# Patient Record
Sex: FEMALE | Race: WHITE | Employment: OTHER | URBAN - METROPOLITAN AREA
[De-identification: names, ages, dates, MRNs, and addresses within clinical notes are randomized per-mention and may not be internally consistent; named-entity substitution may affect disease eponyms.]

---

## 2022-03-18 PROBLEM — N30.10 INTERSTITIAL CYSTITIS: Status: ACTIVE | Noted: 2021-04-01

## 2022-03-19 PROBLEM — N30.00 ACUTE CYSTITIS WITHOUT HEMATURIA: Status: ACTIVE | Noted: 2021-04-01

## 2022-08-04 ENCOUNTER — OFFICE VISIT (OUTPATIENT)
Dept: OBGYN CLINIC | Age: 82
End: 2022-08-04
Payer: MEDICARE

## 2022-08-04 VITALS — DIASTOLIC BLOOD PRESSURE: 74 MMHG | SYSTOLIC BLOOD PRESSURE: 120 MMHG

## 2022-08-04 DIAGNOSIS — N76.0 VAGINITIS AND VULVOVAGINITIS: Primary | ICD-10-CM

## 2022-08-04 DIAGNOSIS — R51.9 NONINTRACTABLE HEADACHE, UNSPECIFIED CHRONICITY PATTERN, UNSPECIFIED HEADACHE TYPE: ICD-10-CM

## 2022-08-04 DIAGNOSIS — R30.0 DYSURIA: ICD-10-CM

## 2022-08-04 LAB
BILIRUBIN, URINE, POC: NEGATIVE
BLOOD URINE, POC: NEGATIVE
GLUCOSE URINE, POC: NEGATIVE
KETONES, URINE, POC: NEGATIVE
LEUKOCYTE ESTERASE, URINE, POC: NORMAL
NITRITE, URINE, POC: NEGATIVE
PH, URINE, POC: 6 (ref 4.6–8)
PROTEIN,URINE, POC: NEGATIVE
SPECIFIC GRAVITY, URINE, POC: 1.02 (ref 1–1.03)
URINALYSIS CLARITY, POC: NORMAL
URINALYSIS COLOR, POC: YELLOW
UROBILINOGEN, POC: NORMAL

## 2022-08-04 PROCEDURE — 81002 URINALYSIS NONAUTO W/O SCOPE: CPT | Performed by: OBSTETRICS & GYNECOLOGY

## 2022-08-04 PROCEDURE — 99214 OFFICE O/P EST MOD 30 MIN: CPT | Performed by: OBSTETRICS & GYNECOLOGY

## 2022-08-04 PROCEDURE — 1123F ACP DISCUSS/DSCN MKR DOCD: CPT | Performed by: OBSTETRICS & GYNECOLOGY

## 2022-08-04 RX ORDER — NYSTATIN AND TRIAMCINOLONE ACETONIDE 100000; 1 [USP'U]/G; MG/G
OINTMENT TOPICAL
Qty: 60 G | Refills: 1 | Status: SHIPPED | OUTPATIENT
Start: 2022-08-04 | End: 2022-08-16 | Stop reason: SDUPTHER

## 2022-08-04 RX ORDER — BUTALBITAL, ACETAMINOPHEN AND CAFFEINE 50; 325; 40 MG/1; MG/1; MG/1
1 TABLET ORAL EVERY 6 HOURS PRN
Qty: 180 TABLET | Refills: 5 | Status: SHIPPED | OUTPATIENT
Start: 2022-08-04

## 2022-08-07 LAB
BACTERIA SPEC CULT: ABNORMAL
BACTERIA SPEC CULT: ABNORMAL
SERVICE CMNT-IMP: ABNORMAL

## 2022-08-08 ENCOUNTER — TELEPHONE (OUTPATIENT)
Dept: OBGYN CLINIC | Age: 82
End: 2022-08-08

## 2022-08-08 NOTE — TELEPHONE ENCOUNTER
----- Message from Jorge Perry MD sent at 8/8/2022  1:46 PM EDT -----  Will you please call her and either bring her in for a shot of rocephin, 1g IM, or offer to forward these results to her PCP if she is seeing them soon. Someone else was managing this also, I think.   Whitney Keenan    ----- Message -----  From: Ellen Nur Incoming [place] [employer]  Sent: 8/7/2022   1:23 PM EDT  To: Jorge Perry MD

## 2022-08-08 NOTE — TELEPHONE ENCOUNTER
Called pt, made aware of results. She is scheduled for [facility] tomorrow. All questions answered.

## 2022-08-09 ENCOUNTER — NURSE ONLY (OUTPATIENT)
Dept: OBGYN CLINIC | Age: 82
End: 2022-08-09
Payer: MEDICARE

## 2022-08-09 DIAGNOSIS — N39.0 URINARY TRACT INFECTION WITHOUT HEMATURIA, SITE UNSPECIFIED: Primary | ICD-10-CM

## 2022-08-09 PROCEDURE — 96372 THER/PROPH/DIAG INJ SC/IM: CPT | Performed by: OBSTETRICS & GYNECOLOGY

## 2022-08-09 RX ORDER — CEFTRIAXONE 500 MG/1
500 INJECTION, POWDER, FOR SOLUTION INTRAMUSCULAR; INTRAVENOUS EVERY 24 HOURS
Status: COMPLETED | OUTPATIENT
Start: 2022-08-09 | End: 2022-08-09

## 2022-08-09 RX ADMIN — CEFTRIAXONE 500 MG: 500 INJECTION, POWDER, FOR SOLUTION INTRAMUSCULAR; INTRAVENOUS at 13:09

## 2022-08-09 RX ADMIN — CEFTRIAXONE 500 MG: 500 INJECTION, POWDER, FOR SOLUTION INTRAMUSCULAR; INTRAVENOUS at 13:12

## 2022-08-09 NOTE — PROGRESS NOTES
Pt presents for Rocephin 1 gram IM for UTI-per Dr Suki Espitia recommendations. Reconstituted 500mg x2  Given in RGM   Pt tolerated well with no adverse reaction- pt waited in office for about 15 min after injection to make sure she did not have any issues. LOT number [id number]  EXP date Aug 2024      Follow up prn. Voiced full understanding.

## 2022-08-16 ENCOUNTER — OFFICE VISIT (OUTPATIENT)
Dept: OBGYN CLINIC | Age: 82
End: 2022-08-16
Payer: MEDICARE

## 2022-08-16 VITALS — BODY MASS INDEX: 30.54 KG/M2 | DIASTOLIC BLOOD PRESSURE: 76 MMHG | HEIGHT: 62 IN | SYSTOLIC BLOOD PRESSURE: 116 MMHG

## 2022-08-16 DIAGNOSIS — B35.6 TINEA CRURIS: ICD-10-CM

## 2022-08-16 DIAGNOSIS — N81.4 UTEROVAGINAL PROLAPSE, UNSPECIFIED: ICD-10-CM

## 2022-08-16 DIAGNOSIS — B35.4 TINEA CORPORIS: ICD-10-CM

## 2022-08-16 DIAGNOSIS — N76.0 VAGINITIS AND VULVOVAGINITIS: Primary | ICD-10-CM

## 2022-08-16 PROCEDURE — 99214 OFFICE O/P EST MOD 30 MIN: CPT | Performed by: OBSTETRICS & GYNECOLOGY

## 2022-08-16 PROCEDURE — G8427 DOCREV CUR MEDS BY ELIG CLIN: HCPCS | Performed by: OBSTETRICS & GYNECOLOGY

## 2022-08-16 PROCEDURE — 1123F ACP DISCUSS/DSCN MKR DOCD: CPT | Performed by: OBSTETRICS & GYNECOLOGY

## 2022-08-16 PROCEDURE — 1090F PRES/ABSN URINE INCON ASSESS: CPT | Performed by: OBSTETRICS & GYNECOLOGY

## 2022-08-16 PROCEDURE — G8399 PT W/DXA RESULTS DOCUMENT: HCPCS | Performed by: OBSTETRICS & GYNECOLOGY

## 2022-08-16 PROCEDURE — 1036F TOBACCO NON-USER: CPT | Performed by: OBSTETRICS & GYNECOLOGY

## 2022-08-16 PROCEDURE — G8417 CALC BMI ABV UP PARAM F/U: HCPCS | Performed by: OBSTETRICS & GYNECOLOGY

## 2022-08-16 RX ORDER — MIRTAZAPINE 15 MG/1
15 TABLET, FILM COATED ORAL EVERY EVENING
COMMUNITY
Start: 2022-08-10

## 2022-08-16 RX ORDER — NYSTATIN AND TRIAMCINOLONE ACETONIDE 100000; 1 [USP'U]/G; MG/G
OINTMENT TOPICAL
Qty: 60 G | Refills: 1 | Status: SHIPPED | OUTPATIENT
Start: 2022-08-16

## 2022-08-16 NOTE — PROGRESS NOTES
Patient with prolapse, incontinence and chronic yeast.  Here for followup. Meds have improved symptoms significantly per patient. She requests refill of terazol and mycology as she used some of her supply. Feels much better. Not well but headed in the right direction. AOK. Reassured regarding concerns. Follow up with dermatologist tomorrow as indicated. Patient counseled face to face for 30 minutes regarding her complaints, differential diagnosis and disposition with greater than 50% of the time spent in this way.

## 2022-09-21 ENCOUNTER — OFFICE VISIT (OUTPATIENT)
Dept: OBGYN CLINIC | Age: 82
End: 2022-09-21
Payer: MEDICARE

## 2022-09-21 VITALS — BODY MASS INDEX: 30.54 KG/M2 | HEIGHT: 62 IN

## 2022-09-21 DIAGNOSIS — R30.0 DYSURIA: Primary | ICD-10-CM

## 2022-09-21 LAB
BILIRUBIN, URINE, POC: NEGATIVE
BLOOD URINE, POC: NORMAL
GLUCOSE URINE, POC: NEGATIVE
KETONES, URINE, POC: NEGATIVE
LEUKOCYTE ESTERASE, URINE, POC: NORMAL
NITRITE, URINE, POC: POSITIVE
PH, URINE, POC: 5 (ref 4.6–8)
PROTEIN,URINE, POC: NEGATIVE
SPECIFIC GRAVITY, URINE, POC: 1.01 (ref 1–1.03)
URINALYSIS CLARITY, POC: CLEAR
URINALYSIS COLOR, POC: YELLOW
UROBILINOGEN, POC: NORMAL

## 2022-09-21 PROCEDURE — G8427 DOCREV CUR MEDS BY ELIG CLIN: HCPCS | Performed by: OBSTETRICS & GYNECOLOGY

## 2022-09-21 PROCEDURE — 81002 URINALYSIS NONAUTO W/O SCOPE: CPT | Performed by: OBSTETRICS & GYNECOLOGY

## 2022-09-21 PROCEDURE — 1090F PRES/ABSN URINE INCON ASSESS: CPT | Performed by: OBSTETRICS & GYNECOLOGY

## 2022-09-21 PROCEDURE — 1123F ACP DISCUSS/DSCN MKR DOCD: CPT | Performed by: OBSTETRICS & GYNECOLOGY

## 2022-09-21 PROCEDURE — G8399 PT W/DXA RESULTS DOCUMENT: HCPCS | Performed by: OBSTETRICS & GYNECOLOGY

## 2022-09-21 PROCEDURE — 1036F TOBACCO NON-USER: CPT | Performed by: OBSTETRICS & GYNECOLOGY

## 2022-09-21 PROCEDURE — 99214 OFFICE O/P EST MOD 30 MIN: CPT | Performed by: OBSTETRICS & GYNECOLOGY

## 2022-09-21 PROCEDURE — G8417 CALC BMI ABV UP PARAM F/U: HCPCS | Performed by: OBSTETRICS & GYNECOLOGY

## 2022-09-21 RX ORDER — FAMOTIDINE 20 MG/1
TABLET, FILM COATED ORAL
COMMUNITY
Start: 2022-09-07

## 2022-09-21 RX ORDER — CEPHALEXIN 500 MG/1
500 CAPSULE ORAL 2 TIMES DAILY
Qty: 14 CAPSULE | Refills: 0 | Status: SHIPPED | OUTPATIENT
Start: 2022-09-21 | End: 2022-09-28

## 2022-09-21 ASSESSMENT — PATIENT HEALTH QUESTIONNAIRE - PHQ9
SUM OF ALL RESPONSES TO PHQ QUESTIONS 1-9: 0
SUM OF ALL RESPONSES TO PHQ QUESTIONS 1-9: 0
2. FEELING DOWN, DEPRESSED OR HOPELESS: 0
SUM OF ALL RESPONSES TO PHQ QUESTIONS 1-9: 0
SUM OF ALL RESPONSES TO PHQ9 QUESTIONS 1 & 2: 0
SUM OF ALL RESPONSES TO PHQ QUESTIONS 1-9: 0
1. LITTLE INTEREST OR PLEASURE IN DOING THINGS: 0

## 2022-09-21 NOTE — PROGRESS NOTES
History of Present Illness:  Patient returns to the office today for further evaluation and management of an odor emanating from either her bladder or her vagina. She was treated for Klebsiella urinary tract infection with Rocephin a few weeks ago and reports that those symptoms abated quite quickly. She relates, however, that very similar symptoms have now recurred in addition to an odor that she is aware of. She denies specifically being aware of the odor constantly, but as she does use the restroom quite frequently that is when she primarily notices it. She is otherwise without complaints or concerns. Physical Exam:  Pelvic exam is performed, which reveals stable vaginal prolapse with well estrogenized appearing vaginal mucosa apparent over the visible tissue at the introitus. Wet prep is collected. There is no noted odor during the exam.  The vagina appears well estrogenized throughout. There does not appear to be any obvious vaginal discharge or skin breakdown. Wet prep fails to reveal any clue cells, any hyphal elements or any trichomonads. Impression/Plan: The patient is counseled to this effect. My impression is that she has a recurrence of her urinary tract infection of some form or fashion. Urine culture is sent. Empiric treatment with Keflex is prescribed today. She has tolerated this in the past, but ultimately reveals that she has had recent allergy testing and is no longer allergic to penicillin based on the lab testing. We will follow up on her urine culture and see her back in approximately two weeks' time to ensure complete resolution of this problem or on a p.r.n. basis. Additionally, she reports that her  got results today of progressing colon cancer despite seven months of treatment. She is understandably upset by this, but states that they will continue to follow the course. We will see her back in two weeks' time or on a p.r.n. basis.   Prescription for Keflex is sent as indicated. Bactrim Counseling:  I discussed with the patient the risks of sulfa antibiotics including but not limited to GI upset, allergic reaction, drug rash, diarrhea, dizziness, photosensitivity, and yeast infections.  Rarely, more serious reactions can occur including but not limited to aplastic anemia, agranulocytosis, methemoglobinemia, blood dyscrasias, liver or kidney failure, lung infiltrates or desquamative/blistering drug rashes.

## 2022-09-24 LAB
BACTERIA SPEC CULT: ABNORMAL
SERVICE CMNT-IMP: ABNORMAL

## 2022-09-26 ENCOUNTER — TELEPHONE (OUTPATIENT)
Dept: OBGYN CLINIC | Age: 82
End: 2022-09-26

## 2022-09-26 NOTE — TELEPHONE ENCOUNTER
----- Message from Edgardo Lynch MD sent at 9/26/2022  8:17 AM EDT -----  Please call her and check on her. I gave her Keflex last week and it should work but lets make sure.   Thanks [employer]

## 2022-09-26 NOTE — TELEPHONE ENCOUNTER
Called pt and made her aware of urine culture results. She states that she has noticed improvement in urinary odor and frequency. She does not feel that she is completely treated, however, she still has 2 more days left of medication. She is advised to ensure that she finishes the abx completely and let us know if she does not have completely resolved symptoms after completion. She voiced full understanding and is aware. All questions answered.

## 2022-09-28 ENCOUNTER — TELEPHONE (OUTPATIENT)
Dept: OBGYN CLINIC | Age: 82
End: 2022-09-28

## 2022-09-28 NOTE — TELEPHONE ENCOUNTER
Patient called requesting a refill of keflex. She states that she is still having symptoms of UTI and wants to know if she should take keflex again. Routed to Dr. Percy Fletcher for recommendations.

## 2022-09-29 RX ORDER — CIPROFLOXACIN 250 MG/1
250 TABLET, FILM COATED ORAL 2 TIMES DAILY
Qty: 14 TABLET | Refills: 0 | Status: SHIPPED | OUTPATIENT
Start: 2022-09-29 | End: 2022-10-06

## 2022-09-29 NOTE — TELEPHONE ENCOUNTER
Orders Placed This Encounter    ciprofloxacin (CIPRO) 250 MG tablet     Sig: Take 1 tablet by mouth 2 times daily for 7 days     Dispense:  14 tablet     Refill:  0

## 2022-09-29 NOTE — TELEPHONE ENCOUNTER
Pt returned call, she is aware of recommendations for cipro and voiced full understanding. She is aware to contact the office with any additional concerns. All questions answered.

## 2022-09-29 NOTE — TELEPHONE ENCOUNTER
Spoke with Dr. Jose A Peng in office since Dr. Sade Pappas is out of the office. He advises to send in cipro 250 mg BID for 7 days since urine culture shows susceptibility to cipro and allergy list in chart does not show allergy to cipro. LVM for pt to return my call.

## 2022-09-30 ENCOUNTER — TELEPHONE (OUTPATIENT)
Dept: OBGYN CLINIC | Age: 82
End: 2022-09-30

## 2022-09-30 RX ORDER — SULFAMETHOXAZOLE AND TRIMETHOPRIM 800; 160 MG/1; MG/1
1 TABLET ORAL 2 TIMES DAILY
Qty: 10 TABLET | Refills: 0 | Status: SHIPPED | OUTPATIENT
Start: 2022-09-30 | End: 2022-10-05

## 2022-09-30 NOTE — TELEPHONE ENCOUNTER
Patient called requesting to have cipro rx changed to keflex because she states that cipro will affect her tendons and she has torn tendons. She states that she has been advised not to take this. She uses [facility] in [facility]. Spoke with Dr. Savannah Lux in office, she advises to rx bactrim DS BID for 5 days. Spoke with pt and she is aware of recommendations and states that she has taken bactrim before and tolerates this well. She is advised to contact us with any other questions or concerns. All questions answered.

## 2022-10-06 ENCOUNTER — OFFICE VISIT (OUTPATIENT)
Dept: OBGYN CLINIC | Age: 82
End: 2022-10-06
Payer: MEDICARE

## 2022-10-06 VITALS
WEIGHT: 173.4 LBS | HEIGHT: 62 IN | BODY MASS INDEX: 31.91 KG/M2 | DIASTOLIC BLOOD PRESSURE: 85 MMHG | SYSTOLIC BLOOD PRESSURE: 139 MMHG

## 2022-10-06 DIAGNOSIS — N30.00 ACUTE CYSTITIS WITHOUT HEMATURIA: Primary | ICD-10-CM

## 2022-10-06 PROCEDURE — G8399 PT W/DXA RESULTS DOCUMENT: HCPCS | Performed by: OBSTETRICS & GYNECOLOGY

## 2022-10-06 PROCEDURE — 1036F TOBACCO NON-USER: CPT | Performed by: OBSTETRICS & GYNECOLOGY

## 2022-10-06 PROCEDURE — 1090F PRES/ABSN URINE INCON ASSESS: CPT | Performed by: OBSTETRICS & GYNECOLOGY

## 2022-10-06 PROCEDURE — 1123F ACP DISCUSS/DSCN MKR DOCD: CPT | Performed by: OBSTETRICS & GYNECOLOGY

## 2022-10-06 PROCEDURE — G8417 CALC BMI ABV UP PARAM F/U: HCPCS | Performed by: OBSTETRICS & GYNECOLOGY

## 2022-10-06 PROCEDURE — G8484 FLU IMMUNIZE NO ADMIN: HCPCS | Performed by: OBSTETRICS & GYNECOLOGY

## 2022-10-06 PROCEDURE — G8427 DOCREV CUR MEDS BY ELIG CLIN: HCPCS | Performed by: OBSTETRICS & GYNECOLOGY

## 2022-10-06 PROCEDURE — 99214 OFFICE O/P EST MOD 30 MIN: CPT | Performed by: OBSTETRICS & GYNECOLOGY

## 2022-10-06 NOTE — PROGRESS NOTES
History of Present Illness: The patient reports that her urinary tract symptoms have resolved. She is concerned that she may be developing a yeast infection. She has itching externally on her periurethral and vulvar area. Physical Exam:  On exam today, she has normal external genitalia, [place], [place], urethral glands. Her prolapse is stable. There is no yeast or hyphal elements apparent. Assessment and Plan:  I have suggested to her I will refill her Terazol, as well as her Nystatin cream, which she uses frequently for symptomatic relief. I have suggested that she may use this for a few days in an effort to provide some symptomatic relief. With respect to her upcoming cardiac ablation, I have suggested to her that she not have a urinary tract infection prior to that appointment. She agrees stating they said that if she has one they will cancel the procedure. With this in mind, I have suggested that she return to the office next week for urine culture only so that we will have time to address any urinary tract infection or colonization as the case may be prior to her scheduled cardiac ablation. She is amenable to this plan. She will contact the office if her external genital itching is not improved with her Nystatin or Terazol as indicated.

## 2022-10-11 ENCOUNTER — NURSE ONLY (OUTPATIENT)
Dept: OBGYN CLINIC | Age: 82
End: 2022-10-11

## 2022-10-11 DIAGNOSIS — N30.00 ACUTE CYSTITIS WITHOUT HEMATURIA: ICD-10-CM

## 2022-10-15 LAB
BACTERIA SPEC CULT: ABNORMAL
BACTERIA SPEC CULT: ABNORMAL
SERVICE CMNT-IMP: ABNORMAL

## 2022-10-17 ENCOUNTER — NURSE ONLY (OUTPATIENT)
Dept: OBGYN CLINIC | Age: 82
End: 2022-10-17
Payer: MEDICARE

## 2022-10-17 ENCOUNTER — TELEPHONE (OUTPATIENT)
Dept: OBGYN CLINIC | Age: 82
End: 2022-10-17

## 2022-10-17 DIAGNOSIS — N39.0 URINARY TRACT INFECTION WITHOUT HEMATURIA, SITE UNSPECIFIED: Primary | ICD-10-CM

## 2022-10-17 PROCEDURE — 96372 THER/PROPH/DIAG INJ SC/IM: CPT | Performed by: OBSTETRICS & GYNECOLOGY

## 2022-10-17 RX ORDER — CEFTRIAXONE 1 G/1
1000 INJECTION, POWDER, FOR SOLUTION INTRAMUSCULAR; INTRAVENOUS ONCE
Status: SHIPPED | OUTPATIENT
Start: 2022-10-17

## 2022-10-17 RX ORDER — CEFTRIAXONE 500 MG/1
500 INJECTION, POWDER, FOR SOLUTION INTRAMUSCULAR; INTRAVENOUS ONCE
Status: COMPLETED | OUTPATIENT
Start: 2022-10-17 | End: 2022-10-17

## 2022-10-17 RX ADMIN — CEFTRIAXONE 500 MG: 500 INJECTION, POWDER, FOR SOLUTION INTRAMUSCULAR; INTRAVENOUS at 16:34

## 2022-10-17 RX ADMIN — CEFTRIAXONE 500 MG: 500 INJECTION, POWDER, FOR SOLUTION INTRAMUSCULAR; INTRAVENOUS at 16:35

## 2022-10-17 NOTE — TELEPHONE ENCOUNTER
Called pt, made aware of results and Dr Roth Honour recommendations. She is scheduled to come in today.

## 2022-10-17 NOTE — TELEPHONE ENCOUNTER
----- Message from Addie Castillo MD sent at 10/17/2022  8:49 AM EDT -----  Please have her come in for Rocephin 1g IM.   Thanks, Daniel Primer  ----- Message -----  From: Ellen Nur Incoming [place] [employer]  Sent: 10/15/2022   7:59 AM EDT  To: Addie Castillo MD

## 2022-10-17 NOTE — PROGRESS NOTES
Pt presented to the office for rocephin injection. Pt was given 1000 mg rocephin IM into the right gluteus samia. Pt tolerated well without adverse reaction.

## 2022-10-17 NOTE — TELEPHONE ENCOUNTER
Spoke with pt after speaking with Dr Alen Méndez. Per Dr Alen Méndez, after reviewing urine culture results with him, there is nothing else that we can prescribed her orally to go along with the rocephin. She is fully aware of this, she will talk with her cardiologist to make them aware. She is scheduled for cardiac ablation on Monday. She will call back prn. All questions answered. She is given the name of the bacteria that grew in her urine the last 3 times.

## 2022-10-21 ENCOUNTER — TELEPHONE (OUTPATIENT)
Dept: OBGYN CLINIC | Age: 82
End: 2022-10-21

## 2022-10-21 NOTE — TELEPHONE ENCOUNTER
Pt called in and left voicemail to have someone contact her. She stated that she spoke with Juliana Leiva on Monday and received a Rocephin injection for a UTI. She believes she is have some kind of complications and has surgery on Monday. She would like a call back today, please.

## 2022-10-21 NOTE — TELEPHONE ENCOUNTER
Called pt, she states that she has Rx for bactrim that she used to take daily for UTI suppression. She states that her PCP told her that it was ok to start taking this. She is aware to notify her surgeon of this medication. Voiced full understanding.

## 2022-11-02 ENCOUNTER — OFFICE VISIT (OUTPATIENT)
Dept: OBGYN CLINIC | Age: 82
End: 2022-11-02
Payer: MEDICARE

## 2022-11-02 VITALS — HEIGHT: 62 IN | DIASTOLIC BLOOD PRESSURE: 74 MMHG | BODY MASS INDEX: 31.72 KG/M2 | SYSTOLIC BLOOD PRESSURE: 122 MMHG

## 2022-11-02 DIAGNOSIS — R30.0 DYSURIA: Primary | ICD-10-CM

## 2022-11-02 DIAGNOSIS — N81.4 UTEROVAGINAL PROLAPSE, UNSPECIFIED: ICD-10-CM

## 2022-11-02 LAB
BILIRUBIN, URINE, POC: NEGATIVE
BLOOD URINE, POC: NEGATIVE
GLUCOSE URINE, POC: NEGATIVE
KETONES, URINE, POC: NEGATIVE
LEUKOCYTE ESTERASE, URINE, POC: NEGATIVE
NITRITE, URINE, POC: NEGATIVE
PH, URINE, POC: 5 (ref 4.6–8)
PROTEIN,URINE, POC: NEGATIVE
SPECIFIC GRAVITY, URINE, POC: 1 (ref 1–1.03)
URINALYSIS CLARITY, POC: CLEAR
URINALYSIS COLOR, POC: NORMAL
UROBILINOGEN, POC: NORMAL

## 2022-11-02 PROCEDURE — 99213 OFFICE O/P EST LOW 20 MIN: CPT | Performed by: OBSTETRICS & GYNECOLOGY

## 2022-11-02 PROCEDURE — 81002 URINALYSIS NONAUTO W/O SCOPE: CPT | Performed by: OBSTETRICS & GYNECOLOGY

## 2022-11-02 PROCEDURE — G8427 DOCREV CUR MEDS BY ELIG CLIN: HCPCS | Performed by: OBSTETRICS & GYNECOLOGY

## 2022-11-02 PROCEDURE — G8399 PT W/DXA RESULTS DOCUMENT: HCPCS | Performed by: OBSTETRICS & GYNECOLOGY

## 2022-11-02 PROCEDURE — G8417 CALC BMI ABV UP PARAM F/U: HCPCS | Performed by: OBSTETRICS & GYNECOLOGY

## 2022-11-02 PROCEDURE — 1090F PRES/ABSN URINE INCON ASSESS: CPT | Performed by: OBSTETRICS & GYNECOLOGY

## 2022-11-02 PROCEDURE — 1036F TOBACCO NON-USER: CPT | Performed by: OBSTETRICS & GYNECOLOGY

## 2022-11-02 PROCEDURE — G8484 FLU IMMUNIZE NO ADMIN: HCPCS | Performed by: OBSTETRICS & GYNECOLOGY

## 2022-11-02 PROCEDURE — 1123F ACP DISCUSS/DSCN MKR DOCD: CPT | Performed by: OBSTETRICS & GYNECOLOGY

## 2022-11-02 NOTE — PROGRESS NOTES
History of Present Illness:  Patient returns to the office today over concern regarding a potential injury that she sustained during her inpatient cardiac ablation. She was incapacitated due to her anticoagulation for a period of approximately 10-24 hours and ultimately a vacuum urinary evacuator was utilized to ensure adequate emptying of the bladder while she was incapacitated. She relates that on several occasions that the device was abruptly removed from her vulva and that on the last occasion she went to the restroom and noticed bright red bleeding from her introitus. She was concerned that this may have been due to an injury of the known vaginal vault prolapse and presents to the office today for further evaluation and management. She mentions that she saw no further bleeding starting two days ago and that there is no discomfort down there. She does request urine culture at this point because of discomfort with urination, which is something that she struggles with as we know. Urine culture is sent. We will follow up on it and notify her of any further disposition that may be required. Physical Exam:  On exam today, she has normal external genitalia, healthy appearing, pink, complete vaginal vault prolapse with no evidence of ulceration or laceration or abrasion anywhere on the inspection. There is an area on her right labia majora, which appears to have been injured, but is healing well with no evidence of bleeding currently or even recently apparent. Discussion/Plan:  The patient is reassured and counseled that I do not believe she sustained any significant injury during her hospitalization, but that we will follow up on a urine culture and notify her of any further disposition. She seems pleased with this. We will be available to her on a p.r.n. basis as indicated.

## 2022-11-05 LAB
BACTERIA SPEC CULT: NORMAL
SERVICE CMNT-IMP: NORMAL

## 2022-11-29 ENCOUNTER — OFFICE VISIT (OUTPATIENT)
Dept: OBGYN CLINIC | Age: 82
End: 2022-11-29
Payer: MEDICARE

## 2022-11-29 DIAGNOSIS — N76.0 VAGINITIS AND VULVOVAGINITIS: ICD-10-CM

## 2022-11-29 DIAGNOSIS — R30.0 DYSURIA: Primary | ICD-10-CM

## 2022-11-29 DIAGNOSIS — N81.10 VAGINAL PROLAPSE: ICD-10-CM

## 2022-11-29 LAB
BILIRUBIN, URINE, POC: NEGATIVE
BLOOD URINE, POC: NORMAL
GLUCOSE URINE, POC: NEGATIVE
KETONES, URINE, POC: NEGATIVE
LEUKOCYTE ESTERASE, URINE, POC: NORMAL
NITRITE, URINE, POC: NEGATIVE
PH, URINE, POC: 5 (ref 4.6–8)
PROTEIN,URINE, POC: NEGATIVE
SPECIFIC GRAVITY, URINE, POC: 1 (ref 1–1.03)
URINALYSIS CLARITY, POC: CLEAR
URINALYSIS COLOR, POC: YELLOW
UROBILINOGEN, POC: NORMAL

## 2022-11-29 PROCEDURE — G8399 PT W/DXA RESULTS DOCUMENT: HCPCS | Performed by: OBSTETRICS & GYNECOLOGY

## 2022-11-29 PROCEDURE — 1036F TOBACCO NON-USER: CPT | Performed by: OBSTETRICS & GYNECOLOGY

## 2022-11-29 PROCEDURE — 1123F ACP DISCUSS/DSCN MKR DOCD: CPT | Performed by: OBSTETRICS & GYNECOLOGY

## 2022-11-29 PROCEDURE — G8427 DOCREV CUR MEDS BY ELIG CLIN: HCPCS | Performed by: OBSTETRICS & GYNECOLOGY

## 2022-11-29 PROCEDURE — 81002 URINALYSIS NONAUTO W/O SCOPE: CPT | Performed by: OBSTETRICS & GYNECOLOGY

## 2022-11-29 PROCEDURE — G8417 CALC BMI ABV UP PARAM F/U: HCPCS | Performed by: OBSTETRICS & GYNECOLOGY

## 2022-11-29 PROCEDURE — G8484 FLU IMMUNIZE NO ADMIN: HCPCS | Performed by: OBSTETRICS & GYNECOLOGY

## 2022-11-29 PROCEDURE — 99214 OFFICE O/P EST MOD 30 MIN: CPT | Performed by: OBSTETRICS & GYNECOLOGY

## 2022-11-29 PROCEDURE — 1090F PRES/ABSN URINE INCON ASSESS: CPT | Performed by: OBSTETRICS & GYNECOLOGY

## 2022-11-29 RX ORDER — NYSTATIN AND TRIAMCINOLONE ACETONIDE 100000; 1 [USP'U]/G; MG/G
OINTMENT TOPICAL
Qty: 60 G | Refills: 1 | Status: SHIPPED | OUTPATIENT
Start: 2022-11-29

## 2022-11-29 ASSESSMENT — PATIENT HEALTH QUESTIONNAIRE - PHQ9
SUM OF ALL RESPONSES TO PHQ9 QUESTIONS 1 & 2: 0
SUM OF ALL RESPONSES TO PHQ QUESTIONS 1-9: 0
1. LITTLE INTEREST OR PLEASURE IN DOING THINGS: 0
SUM OF ALL RESPONSES TO PHQ QUESTIONS 1-9: 0
2. FEELING DOWN, DEPRESSED OR HOPELESS: 0
SUM OF ALL RESPONSES TO PHQ QUESTIONS 1-9: 0
SUM OF ALL RESPONSES TO PHQ QUESTIONS 1-9: 0

## 2022-11-29 NOTE — PROGRESS NOTES
History of Present Illness:   Patient presents to the office today with concerns of external itching, as well as dysuria after being treated with prophylactic Tamiflu. She states the Tamiflu gave her diarrhea and ultimately this has rendered her perianal area and her vulva with some itching and irritation. She is concerned that the prolapse that she also experiences is being affected by this, as she now also has dysuria. Of significant importance to her is that she has a transesophageal echocardiogram scheduled next week and she is concerned that they may cancel the procedure if she has another urinary tract infection. Urine culture is collected today for this reason. She takes Bactrim daily for suppressive therapy per her urologist.      Discussion/Plan:  I have suggested to her that if she desires to take it twice daily for the next few days that if the culture does suggest sensitivity to a sulfa medication that she will already be well into her treatment course. She has been on Bactrim for many years and ultimately continues to have recurrent urinary tract infections that have historically been treated with Rocephin. We will follow up on the culture and notify her of appropriate disposition. I have encouraged her after inspection of her yeast of the external vulva to use her Nystatin and Triamcinolone ointment to provide symptomatic relief externally. She has been taking Diflucan empirically at home. I have suggested that she can continue this for another dose and then stop. We will follow up on her urine culture and anticipate improvement of her symptoms as indicated.

## 2022-12-02 ENCOUNTER — TELEPHONE (OUTPATIENT)
Dept: OBGYN CLINIC | Age: 82
End: 2022-12-02

## 2022-12-02 LAB
BACTERIA SPEC CULT: ABNORMAL
BACTERIA SPEC CULT: ABNORMAL
SERVICE CMNT-IMP: ABNORMAL

## 2022-12-02 RX ORDER — AMOXICILLIN AND CLAVULANATE POTASSIUM 500; 125 MG/1; MG/1
1 TABLET, FILM COATED ORAL 2 TIMES DAILY
Qty: 10 TABLET | Refills: 0 | Status: SHIPPED | OUTPATIENT
Start: 2022-12-02 | End: 2022-12-07

## 2022-12-02 NOTE — TELEPHONE ENCOUNTER
Spoke with Dr. Himanshu Castano, per verbal order by Dr. Himanshu Castano send in augmentin 500 mg BID x5 days. Have patient call the office on Monday if she has not had any symptom improvement. Pt is aware and voiced full understanding. She is advised to call the office if no improvement. All questions answered.

## 2022-12-02 NOTE — TELEPHONE ENCOUNTER
Pt called for urine culture results and recommendations. She states that her UTI symptoms are worsening. Results have not been reviewed by Dr. Maria Luisa Patel at this time, resulted this morning at 9:04 am. Will send to Dr. Maria Luisa Patel for recommendations.

## 2022-12-05 ENCOUNTER — TELEPHONE (OUTPATIENT)
Dept: OBGYN CLINIC | Age: 82
End: 2022-12-05

## 2022-12-05 NOTE — TELEPHONE ENCOUNTER
Pt called stating that she still has abx left to take, however, she is noticing improvement of symptoms. She is having less dysuria and less urinary frequency. She is advised to finish her entire abx and if she does not notice complete improvement of her symptoms to call the office back. She voiced full understanding and is aware. All questions answered and pt advised to call back PRN.

## 2023-02-16 ENCOUNTER — NURSE ONLY (OUTPATIENT)
Dept: OBGYN CLINIC | Age: 83
End: 2023-02-16

## 2023-02-16 DIAGNOSIS — R30.0 DYSURIA: Primary | ICD-10-CM

## 2023-02-18 LAB
BACTERIA SPEC CULT: ABNORMAL
SERVICE CMNT-IMP: ABNORMAL

## 2023-02-20 ENCOUNTER — TELEPHONE (OUTPATIENT)
Dept: OBGYN CLINIC | Age: 83
End: 2023-02-20

## 2023-02-20 NOTE — TELEPHONE ENCOUNTER
----- Message from Nellie Padgett MD sent at 2/20/2023  8:21 AM EST -----  She really needs to be followed by someone else for these things. Rx augmentin 500mg tabs, #10 1pobid.   Kiarra Riggins    ----- Message -----  From: Ellen Nur Incoming [place] [employer]  Sent: 2/18/2023  10:37 AM EST  To: Nellie Padgett MD

## 2023-02-20 NOTE — TELEPHONE ENCOUNTER
Pt calls back. She is aware of results and Dr Davidson Red recommendations. She is seeing Dr Kelby Lundberg, urologist in [place]. He has ordered a CT scan and cystoscope. She has a CT scan scheduled (Friday) to look at bladder and then cystoscope (03/15/23) one month later. She has \"a major yeast problem,\" so she has an appt with Dr Andrew Rodriguez on Thursday for him to evaluate and treat. She is on cefdinir- for uti (Rx'd by Dr Kelby Lundberg) she had a urine culture done with Dr Kelby Lundberg on 02/14/23. She is aware that Rx will not be sent in from Dr Andrew Rodriguez as she is already on antibiotics for the same bacteria. Urine culture 02/14 showed Klebsiella pneumoniae. Voiced full understanding.      Routed to Dr Andrew Rodriguez for [facility]

## 2023-02-23 ENCOUNTER — OFFICE VISIT (OUTPATIENT)
Dept: OBGYN CLINIC | Age: 83
End: 2023-02-23
Payer: MEDICARE

## 2023-02-23 VITALS — HEIGHT: 62 IN | BODY MASS INDEX: 31.72 KG/M2

## 2023-02-23 DIAGNOSIS — B35.6 TINEA CRURIS: Primary | ICD-10-CM

## 2023-02-23 PROCEDURE — G8399 PT W/DXA RESULTS DOCUMENT: HCPCS | Performed by: OBSTETRICS & GYNECOLOGY

## 2023-02-23 PROCEDURE — G8417 CALC BMI ABV UP PARAM F/U: HCPCS | Performed by: OBSTETRICS & GYNECOLOGY

## 2023-02-23 PROCEDURE — 99214 OFFICE O/P EST MOD 30 MIN: CPT | Performed by: OBSTETRICS & GYNECOLOGY

## 2023-02-23 PROCEDURE — G8484 FLU IMMUNIZE NO ADMIN: HCPCS | Performed by: OBSTETRICS & GYNECOLOGY

## 2023-02-23 PROCEDURE — 1036F TOBACCO NON-USER: CPT | Performed by: OBSTETRICS & GYNECOLOGY

## 2023-02-23 PROCEDURE — 1090F PRES/ABSN URINE INCON ASSESS: CPT | Performed by: OBSTETRICS & GYNECOLOGY

## 2023-02-23 PROCEDURE — 1123F ACP DISCUSS/DSCN MKR DOCD: CPT | Performed by: OBSTETRICS & GYNECOLOGY

## 2023-02-23 PROCEDURE — G8427 DOCREV CUR MEDS BY ELIG CLIN: HCPCS | Performed by: OBSTETRICS & GYNECOLOGY

## 2023-02-23 RX ORDER — NYSTATIN AND TRIAMCINOLONE ACETONIDE 100000; 1 [USP'U]/G; MG/G
OINTMENT TOPICAL
Qty: 60 G | Refills: 1 | Status: SHIPPED | OUTPATIENT
Start: 2023-02-23

## 2023-02-23 ASSESSMENT — PATIENT HEALTH QUESTIONNAIRE - PHQ9
SUM OF ALL RESPONSES TO PHQ QUESTIONS 1-9: 0
2. FEELING DOWN, DEPRESSED OR HOPELESS: 0
1. LITTLE INTEREST OR PLEASURE IN DOING THINGS: 0
SUM OF ALL RESPONSES TO PHQ9 QUESTIONS 1 & 2: 0
SUM OF ALL RESPONSES TO PHQ QUESTIONS 1-9: 0

## 2023-02-25 NOTE — PROGRESS NOTES
Christie Benton returns to the office today with complaints of an acute external vulvar itching spell or episode. She was recently treated by her urologist with oral antibiotics in anticipation of CT scan and cystoscopy later this week. She reports that her perineal body and external genital radius started having itching about the time she started her antibiotics. It has not gotten worse, but that it has persisted and she desires further evaluation and management. On inspection, she has some intertriginous yeast.  There does not appear to be vulvovaginaly. Prolapse appears quite well estrogenized. There is no lesions or excrescences apparent. The use of topical Mycolog ointment is recommended simply to provide relieve of her symptoms. She has had a prescription of this, but states that she no longer has any refills. A new prescription is sent for her. She is counseled that we will be available for her for any further evaluation and management. Sincerely hope her Urology evaluation goes well. We will be available to her in the future as indicated.

## 2023-04-27 RX ORDER — NYSTATIN AND TRIAMCINOLONE ACETONIDE 100000; 1 [USP'U]/G; MG/G
OINTMENT TOPICAL
Qty: 60 G | Refills: 1 | Status: SHIPPED | OUTPATIENT
Start: 2023-04-27

## 2023-04-27 NOTE — TELEPHONE ENCOUNTER
Orders Placed This Encounter    nystatin-triamcinolone (MYCOLOG) 618448-3.9 UNIT/GM-% ointment     Sig: Apply topically 2 times daily.      Dispense:  60 g     Refill:  1

## 2023-05-23 ENCOUNTER — TELEPHONE (OUTPATIENT)
Dept: OBGYN CLINIC | Age: 83
End: 2023-05-23

## 2023-05-23 RX ORDER — BUTALBITAL, ACETAMINOPHEN AND CAFFEINE 50; 325; 40 MG/1; MG/1; MG/1
1 TABLET ORAL EVERY 6 HOURS PRN
Qty: 180 TABLET | Refills: 5 | Status: SHIPPED | OUTPATIENT
Start: 2023-05-23

## 2023-06-19 ENCOUNTER — TELEPHONE (OUTPATIENT)
Dept: OBGYN CLINIC | Age: 83
End: 2023-06-19

## 2023-06-19 RX ORDER — CEPHALEXIN 250 MG/1
250 CAPSULE ORAL 4 TIMES DAILY
Qty: 28 CAPSULE | Refills: 0 | Status: SHIPPED | OUTPATIENT
Start: 2023-06-19 | End: 2023-06-26

## 2023-06-19 NOTE — TELEPHONE ENCOUNTER
Pt returned call and is aware of results and recommendations. She would also like her results sent to her PCP. Advised pt that we have to have a signed records release on file. She will stop by the office to complete this. All questions answered and pt advised to call back PRN.

## 2023-06-19 NOTE — TELEPHONE ENCOUNTER
Patient called requesting to speak with someone regarding urine culture results. Urine culture is positive for serratia marcescens >100,000 CFU. Spoke with Dr. Karla Sneed in office since Dr. Eileen Morgan is out of the office this week. Per verbal order by Dr. Karla Sneed treat with keflex 250 mg QID for 7 days. LVM for pt to return my call.

## 2023-06-19 NOTE — TELEPHONE ENCOUNTER
Orders Placed This Encounter    cephALEXin (KEFLEX) 250 MG capsule     Sig: Take 1 capsule by mouth 4 times daily for 7 days     Dispense:  28 capsule     Refill:  0

## 2023-06-28 ENCOUNTER — TELEPHONE (OUTPATIENT)
Dept: OBGYN CLINIC | Age: 83
End: 2023-06-28

## 2023-09-11 ENCOUNTER — OFFICE VISIT (OUTPATIENT)
Dept: OBGYN CLINIC | Age: 83
End: 2023-09-11
Payer: MEDICARE

## 2023-09-11 DIAGNOSIS — R30.0 DYSURIA: Primary | ICD-10-CM

## 2023-09-11 LAB
BILIRUBIN, URINE, POC: NEGATIVE
BLOOD URINE, POC: NORMAL
GLUCOSE URINE, POC: NEGATIVE
KETONES, URINE, POC: NEGATIVE
LEUKOCYTE ESTERASE, URINE, POC: NORMAL
NITRITE, URINE, POC: NEGATIVE
PH, URINE, POC: 6 (ref 4.6–8)
PROTEIN,URINE, POC: NEGATIVE
SPECIFIC GRAVITY, URINE, POC: 1.02 (ref 1–1.03)
URINALYSIS CLARITY, POC: NORMAL
URINALYSIS COLOR, POC: NORMAL
UROBILINOGEN, POC: NORMAL

## 2023-09-11 PROCEDURE — 81002 URINALYSIS NONAUTO W/O SCOPE: CPT | Performed by: OBSTETRICS & GYNECOLOGY

## 2023-09-11 PROCEDURE — 1123F ACP DISCUSS/DSCN MKR DOCD: CPT | Performed by: OBSTETRICS & GYNECOLOGY

## 2023-09-11 PROCEDURE — G8417 CALC BMI ABV UP PARAM F/U: HCPCS | Performed by: OBSTETRICS & GYNECOLOGY

## 2023-09-11 PROCEDURE — 1036F TOBACCO NON-USER: CPT | Performed by: OBSTETRICS & GYNECOLOGY

## 2023-09-11 PROCEDURE — 1090F PRES/ABSN URINE INCON ASSESS: CPT | Performed by: OBSTETRICS & GYNECOLOGY

## 2023-09-11 PROCEDURE — G8399 PT W/DXA RESULTS DOCUMENT: HCPCS | Performed by: OBSTETRICS & GYNECOLOGY

## 2023-09-11 PROCEDURE — G8427 DOCREV CUR MEDS BY ELIG CLIN: HCPCS | Performed by: OBSTETRICS & GYNECOLOGY

## 2023-09-11 PROCEDURE — 99214 OFFICE O/P EST MOD 30 MIN: CPT | Performed by: OBSTETRICS & GYNECOLOGY

## 2023-09-11 RX ORDER — NYSTATIN AND TRIAMCINOLONE ACETONIDE 100000; 1 [USP'U]/G; MG/G
OINTMENT TOPICAL
Qty: 60 G | Refills: 1 | Status: SHIPPED | OUTPATIENT
Start: 2023-09-11

## 2023-09-11 RX ORDER — CLOPIDOGREL BISULFATE 75 MG/1
75 TABLET ORAL DAILY
COMMUNITY

## 2023-09-11 RX ORDER — SULFAMETHOXAZOLE AND TRIMETHOPRIM 400; 80 MG/1; MG/1
1 TABLET ORAL DAILY
COMMUNITY
Start: 2023-08-22

## 2023-09-11 RX ORDER — ESTRADIOL 0.1 MG/G
1 CREAM VAGINAL DAILY
Qty: 42.5 G | Refills: 3 | Status: SHIPPED | OUTPATIENT
Start: 2023-09-11

## 2023-09-11 NOTE — PROGRESS NOTES
passed 8/5 from covid. She is having burning with urination, lower back pain, and increased urinary frequency. Dr. Ramiro Vega out of the office this week. She is having external vaginal burning and itching. Does have new urologist but has had to cancel cystoscopy due to husbands declining health. Has rx bactrim to take daily, held bactrim today to have sample checked. Symptoms are most certainly due to prolapse and chronic retention. Exam confirms. UCX with sensitivities. Await results prior to Rx. Topical yeast treatment recommended. Refill sent.   Samy Angela

## 2023-09-15 LAB
BACTERIA SPEC CULT: ABNORMAL
BACTERIA SPEC CULT: ABNORMAL
SERVICE CMNT-IMP: ABNORMAL

## 2023-09-19 ENCOUNTER — TELEPHONE (OUTPATIENT)
Dept: OBGYN CLINIC | Age: 83
End: 2023-09-19

## 2023-09-19 RX ORDER — CIPROFLOXACIN 250 MG/1
250 TABLET, FILM COATED ORAL 2 TIMES DAILY
Qty: 6 TABLET | Refills: 0 | Status: SHIPPED | OUTPATIENT
Start: 2023-09-19 | End: 2023-09-22

## 2023-09-19 NOTE — TELEPHONE ENCOUNTER
----- Message from Harpreet Smith MD sent at 9/15/2023  1:21 PM EDT -----  Cipro 250mg pobid x 3 days.   #6, 0RF.  ----- Message -----  From: Ra Crowe Incoming [place] [employer]  Sent: 9/12/2023  10:34 AM EDT  To: Harpreet Smith MD

## 2024-02-29 ENCOUNTER — OFFICE VISIT (OUTPATIENT)
Dept: OBGYN CLINIC | Age: 84
End: 2024-02-29
Payer: MEDICARE

## 2024-02-29 VITALS
SYSTOLIC BLOOD PRESSURE: 118 MMHG | BODY MASS INDEX: 29.3 KG/M2 | HEIGHT: 62 IN | WEIGHT: 159.2 LBS | DIASTOLIC BLOOD PRESSURE: 74 MMHG

## 2024-02-29 DIAGNOSIS — R30.0 DYSURIA: ICD-10-CM

## 2024-02-29 DIAGNOSIS — R30.0 DYSURIA: Primary | ICD-10-CM

## 2024-02-29 PROCEDURE — G0101 CA SCREEN;PELVIC/BREAST EXAM: HCPCS | Performed by: OBSTETRICS & GYNECOLOGY

## 2024-02-29 PROCEDURE — G8417 CALC BMI ABV UP PARAM F/U: HCPCS | Performed by: OBSTETRICS & GYNECOLOGY

## 2024-02-29 PROCEDURE — G8427 DOCREV CUR MEDS BY ELIG CLIN: HCPCS | Performed by: OBSTETRICS & GYNECOLOGY

## 2024-02-29 RX ORDER — PREDNISONE 10 MG/1
TABLET ORAL
COMMUNITY
Start: 2024-02-19

## 2024-02-29 ASSESSMENT — PATIENT HEALTH QUESTIONNAIRE - PHQ9: DEPRESSION UNABLE TO ASSESS: PT REFUSES

## 2024-02-29 NOTE — PROGRESS NOTES
2024    Kari Barriga  1940  Age: 83 y.o.    No LMP recorded. Patient has had a hysterectomy.      PCP: Bear Clements MD  Patient does see them for regular preventative visits.      HPI: Last pap 21 hysterectomy  Dysuria for the last 2 days, taking bactrim low dose daily for maintenance  Scheduled for mammogram [facility]         Failed to redirect to the Timeline version of the [employer] SmartLink.    Allergies, medications, past medical and surgical history, social history, family history all reviewed.       Review of Systems  Constitutional:  Denies unexplained weight loss or heat or cold intolerance  ENT: Denies change in vision, change in hearing, frequent headaches  Cardiovascular:  Denies chest pain, swelling in legs or feet, shortness of breath when lying flat  Respiratory:  Denies shortness of breath, cough greater than 2 weeks or coughing up blood  Gastro: Denies diarrhea greater than 2 weeks, rectal bleeding, bloody stools, heartburn, or constipation  :  Denies blood in urine, nocturia, dysuria or incontinence  Breast:  Denies nipple discharge, masses or pain  Skin:  Denies rash greater than 2 weeks, change in moles  Musculoskeletal/Neuro:  Denies joint pain, muscle weakness, seizures, loss of balance or frequent falls  Psych:  Denies frequent crying spells or severe anxiety  Heme:  Denies easy bruising, bleeding gums, frequent nosebleeds or swollen lymph nodes  GYN:  Denies bleeding or spotting, See HPI    Vitals:    24 1503   BP: 118/74   Site: Right Upper Arm   Position: Sitting   Weight: 72.2 kg (159 lb 3.2 oz)   Height: 1.575 m (5' 2\")       Body mass index is 29.12 kg/m².    Pt in no distress. Alert and oriented x3. Affect bright.  Well developed, well nourished.  HEENT: normocephalic, atraumatic. Sclerae nonicteric.  Neck supple w/o thyromegaly. Trachea midline.  Heart: regular rate and rhythm, no murmur or gallop  Lungs: clear to auscultation.

## 2024-03-03 LAB
BACTERIA SPEC CULT: ABNORMAL
SERVICE CMNT-IMP: ABNORMAL

## 2024-03-04 ENCOUNTER — TELEPHONE (OUTPATIENT)
Dept: OBGYN CLINIC | Age: 84
End: 2024-03-04

## 2024-03-04 RX ORDER — ESTRADIOL 0.1 MG/G
CREAM VAGINAL
Qty: 42.5 G | Refills: 3 | OUTPATIENT
Start: 2024-03-04

## 2024-03-04 NOTE — TELEPHONE ENCOUNTER
----- Message from Walter Jesus MD sent at 3/2/2024 10:53 AM EST -----  Please let her know culture will be forwarded to her urologist for recommendations for treatment and prevention.  Please forward to urologist.  Thanks  LAINA

## 2024-03-05 NOTE — TELEPHONE ENCOUNTER
Pt calls back she is aware of results. She states that she does not have a urologist as she has not been seen in a year. She asks that her results be sent to Dr Clements so that he can evaluate this and provide her treatment and possibly refer her back to urology.     She is also requesting a refill on Fioricet for her headaches. She is aware that I will have to send this to Dr Jesus for approval. Last Rx written by Dr Jesus 05/2023. She is aware that I will call her back on I hear back from Dr Jesus. Voiced full understanding.

## 2024-03-06 RX ORDER — BUTALBITAL, ACETAMINOPHEN AND CAFFEINE 50; 325; 40 MG/1; MG/1; MG/1
1 TABLET ORAL EVERY 6 HOURS PRN
Qty: 180 TABLET | Refills: 5 | Status: SHIPPED | OUTPATIENT
Start: 2024-03-06

## 2024-03-06 NOTE — TELEPHONE ENCOUNTER
Walter Jesus MD Razayeski, Laura C, RN  Caller: Unspecified (2 days ago,  5:10 PM)  OK to refill fioricet.  Thx. LAINA      Orders Placed This Encounter    butalbital-acetaminophen-caffeine (FIORICET, ESGIC) -40 MG per tablet     Sig: Take 1 tablet by mouth every 6 hours as needed for Headaches     Dispense:  180 tablet     Refill:  5

## 2024-03-07 NOTE — TELEPHONE ENCOUNTER
[employer] message sent to patient to notify that Rx has been sent in.   She did see her PCP on 03/06 re:UTI.  Encounter closed

## 2024-07-22 ENCOUNTER — TELEPHONE (OUTPATIENT)
Dept: OBGYN CLINIC | Age: 84
End: 2024-07-22

## 2024-07-22 NOTE — TELEPHONE ENCOUNTER
Pt called with concerns of external vaginal pain, burning, and soreness. She states that there is one particular spot that is very sore and requests to have an appointment with Dr. Jesus. She is concerned because she is scheduled for a colonoscopy on 8/8/24 after positive cologuard test. She is scheduled for 7/29/24 at 1:45 pm. She voiced full understanding and is aware. All questions answered and pt advised to call back PRN.

## 2024-07-29 ENCOUNTER — OFFICE VISIT (OUTPATIENT)
Dept: OBGYN CLINIC | Age: 84
End: 2024-07-29
Payer: MEDICARE

## 2024-07-29 VITALS
SYSTOLIC BLOOD PRESSURE: 126 MMHG | WEIGHT: 157.2 LBS | HEIGHT: 62 IN | DIASTOLIC BLOOD PRESSURE: 74 MMHG | BODY MASS INDEX: 28.93 KG/M2

## 2024-07-29 DIAGNOSIS — R30.0 DYSURIA: Primary | ICD-10-CM

## 2024-07-29 DIAGNOSIS — N89.8 VAGINAL IRRITATION: ICD-10-CM

## 2024-07-29 DIAGNOSIS — K64.4 EXTERNAL HEMORRHOID: ICD-10-CM

## 2024-07-29 DIAGNOSIS — N39.0 URINARY TRACT INFECTION WITHOUT HEMATURIA, SITE UNSPECIFIED: ICD-10-CM

## 2024-07-29 LAB
BILIRUBIN, URINE, POC: NEGATIVE
BLOOD URINE, POC: NEGATIVE
GLUCOSE URINE, POC: NEGATIVE
KETONES, URINE, POC: NEGATIVE
LEUKOCYTE ESTERASE, URINE, POC: NORMAL
NITRITE, URINE, POC: NEGATIVE
PH, URINE, POC: 5 (ref 4.6–8)
PROTEIN,URINE, POC: NEGATIVE
SPECIFIC GRAVITY, URINE, POC: 1.01 (ref 1–1.03)
URINALYSIS CLARITY, POC: CLEAR
URINALYSIS COLOR, POC: YELLOW
UROBILINOGEN, POC: NORMAL

## 2024-07-29 PROCEDURE — 1090F PRES/ABSN URINE INCON ASSESS: CPT | Performed by: OBSTETRICS & GYNECOLOGY

## 2024-07-29 PROCEDURE — 1123F ACP DISCUSS/DSCN MKR DOCD: CPT | Performed by: OBSTETRICS & GYNECOLOGY

## 2024-07-29 PROCEDURE — G8427 DOCREV CUR MEDS BY ELIG CLIN: HCPCS | Performed by: OBSTETRICS & GYNECOLOGY

## 2024-07-29 PROCEDURE — 81002 URINALYSIS NONAUTO W/O SCOPE: CPT | Performed by: OBSTETRICS & GYNECOLOGY

## 2024-07-29 PROCEDURE — 96372 THER/PROPH/DIAG INJ SC/IM: CPT | Performed by: OBSTETRICS & GYNECOLOGY

## 2024-07-29 PROCEDURE — G8417 CALC BMI ABV UP PARAM F/U: HCPCS | Performed by: OBSTETRICS & GYNECOLOGY

## 2024-07-29 PROCEDURE — 1036F TOBACCO NON-USER: CPT | Performed by: OBSTETRICS & GYNECOLOGY

## 2024-07-29 PROCEDURE — 99214 OFFICE O/P EST MOD 30 MIN: CPT | Performed by: OBSTETRICS & GYNECOLOGY

## 2024-07-29 RX ORDER — CEFTRIAXONE SODIUM 250 MG/1
250 INJECTION, POWDER, FOR SOLUTION INTRAMUSCULAR; INTRAVENOUS ONCE
Status: COMPLETED | OUTPATIENT
Start: 2024-07-29 | End: 2024-07-29

## 2024-07-29 RX ORDER — CEFTRIAXONE 500 MG/1
500 INJECTION, POWDER, FOR SOLUTION INTRAMUSCULAR; INTRAVENOUS ONCE
Status: COMPLETED | OUTPATIENT
Start: 2024-07-29 | End: 2024-07-29

## 2024-07-29 RX ADMIN — CEFTRIAXONE SODIUM 250 MG: 250 INJECTION, POWDER, FOR SOLUTION INTRAMUSCULAR; INTRAVENOUS at 14:57

## 2024-07-29 RX ADMIN — CEFTRIAXONE 500 MG: 500 INJECTION, POWDER, FOR SOLUTION INTRAMUSCULAR; INTRAVENOUS at 14:59

## 2024-07-29 RX ADMIN — CEFTRIAXONE SODIUM 250 MG: 250 INJECTION, POWDER, FOR SOLUTION INTRAMUSCULAR; INTRAVENOUS at 15:00

## 2024-07-29 NOTE — PROGRESS NOTES
Vaginal pain/irritation, external burning and soreness. Pt states she has now developed a UTI, PCP is out of the office and urologist is leaving the practice. Sore spot is in the perineum, this has improved, pt has been using nystatin ointment to the area.   She is having nausea, dysuria, feels like she may have a fever. Feels very uncomfortable in abdomen as well, using heating pad and this helps. Taking zofran for nausea and this helps. Used terazol externally yesterday morning    Patient with symptoms of cystitis, well known to my practice.  She does not appear ill beyond her complaints.   Urine culture sent. Empiric Rocephin prescribed.  She additionally has an external hemorrhoid which is healing but still sensitive.   I have recommended topical therapy for this.  No evidence of yeast.  Prolapse is stable without skin breakdown. She will call the office later this week if she is not improved.

## 2024-07-31 LAB
BACTERIA SPEC CULT: NORMAL
BACTERIA SPEC CULT: NORMAL
SERVICE CMNT-IMP: NORMAL

## 2024-08-01 ENCOUNTER — TELEPHONE (OUTPATIENT)
Dept: OBGYN CLINIC | Age: 84
End: 2024-08-01

## 2024-08-01 NOTE — TELEPHONE ENCOUNTER
Pt called for urine culture results, she is advised that her urine culture is negative for infection, she received an injection of rocephin in office the same day. She states that she is feeling much better and denies symptoms of a UTI. She also requests refill of nystatin ointment to [facility] in [place]. She states that she discussed this with Dr. Jesus at her appointment but refill was not sent. Advised pt that we can send that in for her. She voiced full understanding and is aware. All questions answered and pt advised to call back PRN.

## 2024-08-02 RX ORDER — NYSTATIN 100000 U/G
OINTMENT TOPICAL
Qty: 30 G | Refills: 3 | Status: SHIPPED | OUTPATIENT
Start: 2024-08-02

## 2024-08-02 NOTE — TELEPHONE ENCOUNTER
Walter Jesus MD  You14 hours ago (5:03 PM)       Sure. Walter Rico MD16 hours ago (3:14 PM)     MB  Ok to send in nystatin ointment?

## 2024-08-02 NOTE — TELEPHONE ENCOUNTER
Orders Placed This Encounter    nystatin (MYCOSTATIN) 894641 UNIT/GM ointment     Sig: Apply topically 2 times daily.     Dispense:  30 g     Refill:  3

## 2024-11-05 ENCOUNTER — TELEPHONE (OUTPATIENT)
Dept: OBGYN CLINIC | Age: 84
End: 2024-11-05

## 2024-11-05 RX ORDER — BUTALBITAL, ACETAMINOPHEN AND CAFFEINE 50; 325; 40 MG/1; MG/1; MG/1
1 TABLET ORAL EVERY 6 HOURS PRN
Qty: 180 TABLET | Refills: 5 | Status: SHIPPED | OUTPATIENT
Start: 2024-11-05

## 2024-11-05 RX ORDER — ESTRADIOL 0.1 MG/G
1 CREAM VAGINAL DAILY
Qty: 42.5 G | Refills: 3 | Status: SHIPPED | OUTPATIENT
Start: 2024-11-05

## 2024-11-05 NOTE — TELEPHONE ENCOUNTER
Patient called stating that she needs a refill of estrace cream and fioricet to [facility] in [place]. Last seen on 7/29/24 with Dr. Jesus for pelvic visit. Estrace last prescribed 9/11/23 42.5 gram tube and 3 refills. Fioricet last sent on 3/6/24 #180 with 5 refills. Spoke with Kristy at the pharmacy and she states that patient has refills of fioricet available.

## 2024-11-05 NOTE — TELEPHONE ENCOUNTER
Patient called back from earlier phone call regarding fioricet. She states that she needs a prescription for the tablets not the capsules as the capsules do not work as well for her. Last rx of fioricet tablets sent on 5/23/23 for #180 with 5 refills.

## 2024-11-05 NOTE — TELEPHONE ENCOUNTER
Orders Placed This Encounter    estradiol (ESTRACE) 0.1 MG/GM vaginal cream     Sig: Place 1 g vaginally daily     Dispense:  42.5 g     Refill:  3

## 2024-11-05 NOTE — TELEPHONE ENCOUNTER
Orders Placed This Encounter    butalbital-acetaminophen-caffeine (FIORICET, ESGIC) -40 MG per tablet     Sig: Take 1 tablet by mouth every 6 hours as needed for Headaches     Dispense:  180 tablet     Refill:  5

## 2024-11-05 NOTE — TELEPHONE ENCOUNTER
Spoke with pt, she is aware that she has refills of fioricet according to the pharmacy. She is aware that we will send in estrace cream. All questions answered and pt advised to call back PRN.

## 2025-04-28 RX ORDER — ESTRADIOL 0.1 MG/G
CREAM VAGINAL
Qty: 42.5 G | Refills: 3 | OUTPATIENT
Start: 2025-04-28

## 2025-08-05 ENCOUNTER — TELEPHONE (OUTPATIENT)
Dept: OBGYN CLINIC | Age: 85
End: 2025-08-05

## 2025-08-06 RX ORDER — BETAMETHASONE DIPROPIONATE 0.05 %
OINTMENT (GRAM) TOPICAL DAILY
Qty: 45 G | Refills: 0 | Status: SHIPPED | OUTPATIENT
Start: 2025-08-06

## 2025-08-06 RX ORDER — BUTALBITAL, ACETAMINOPHEN AND CAFFEINE 50; 325; 40 MG/1; MG/1; MG/1
1 TABLET ORAL EVERY 6 HOURS PRN
Qty: 30 TABLET | Refills: 0 | Status: SHIPPED | OUTPATIENT
Start: 2025-08-06